# Patient Record
Sex: FEMALE | Race: BLACK OR AFRICAN AMERICAN | Employment: FULL TIME | URBAN - METROPOLITAN AREA
[De-identification: names, ages, dates, MRNs, and addresses within clinical notes are randomized per-mention and may not be internally consistent; named-entity substitution may affect disease eponyms.]

---

## 2019-09-23 ENCOUNTER — APPOINTMENT (OUTPATIENT)
Dept: VASCULAR SURGERY | Age: 44
End: 2019-09-23
Attending: EMERGENCY MEDICINE
Payer: COMMERCIAL

## 2019-09-23 ENCOUNTER — HOSPITAL ENCOUNTER (EMERGENCY)
Age: 44
Discharge: HOME | End: 2019-09-23
Attending: EMERGENCY MEDICINE
Payer: COMMERCIAL

## 2019-09-23 ENCOUNTER — APPOINTMENT (OUTPATIENT)
Dept: GENERAL RADIOLOGY | Age: 44
End: 2019-09-23
Attending: PHYSICIAN ASSISTANT
Payer: COMMERCIAL

## 2019-09-23 VITALS
DIASTOLIC BLOOD PRESSURE: 84 MMHG | TEMPERATURE: 98.5 F | BODY MASS INDEX: 40.04 KG/M2 | SYSTOLIC BLOOD PRESSURE: 141 MMHG | HEIGHT: 63 IN | OXYGEN SATURATION: 96 % | WEIGHT: 226 LBS | RESPIRATION RATE: 18 BRPM | HEART RATE: 92 BPM

## 2019-09-23 DIAGNOSIS — M62.838 MUSCLE SPASM: ICD-10-CM

## 2019-09-23 DIAGNOSIS — M79.662 PAIN OF LEFT CALF: Primary | ICD-10-CM

## 2019-09-23 LAB
ANION GAP SERPL CALC-SCNC: 8 MMOL/L (ref 3–18)
BASOPHILS # BLD: 0 K/UL (ref 0–0.1)
BASOPHILS NFR BLD: 0 % (ref 0–2)
BUN SERPL-MCNC: 12 MG/DL (ref 7–18)
BUN/CREAT SERPL: 17 (ref 12–20)
CALCIUM SERPL-MCNC: 8.5 MG/DL (ref 8.5–10.1)
CHLORIDE SERPL-SCNC: 109 MMOL/L (ref 100–111)
CO2 SERPL-SCNC: 25 MMOL/L (ref 21–32)
CREAT SERPL-MCNC: 0.71 MG/DL (ref 0.6–1.3)
D DIMER PPP FEU-MCNC: 3.39 UG/ML(FEU)
DIFFERENTIAL METHOD BLD: ABNORMAL
EOSINOPHIL # BLD: 0.1 K/UL (ref 0–0.4)
EOSINOPHIL NFR BLD: 2 % (ref 0–5)
ERYTHROCYTE [DISTWIDTH] IN BLOOD BY AUTOMATED COUNT: 12.7 % (ref 11.6–14.5)
GLUCOSE SERPL-MCNC: 98 MG/DL (ref 74–99)
HCT VFR BLD AUTO: 34.7 % (ref 35–45)
HGB BLD-MCNC: 11.4 G/DL (ref 12–16)
LYMPHOCYTES # BLD: 2.8 K/UL (ref 0.9–3.6)
LYMPHOCYTES NFR BLD: 35 % (ref 21–52)
MCH RBC QN AUTO: 28.4 PG (ref 24–34)
MCHC RBC AUTO-ENTMCNC: 32.9 G/DL (ref 31–37)
MCV RBC AUTO: 86.3 FL (ref 74–97)
MONOCYTES # BLD: 0.5 K/UL (ref 0.05–1.2)
MONOCYTES NFR BLD: 6 % (ref 3–10)
NEUTS SEG # BLD: 4.6 K/UL (ref 1.8–8)
NEUTS SEG NFR BLD: 57 % (ref 40–73)
PLATELET # BLD AUTO: 266 K/UL (ref 135–420)
PMV BLD AUTO: 9.9 FL (ref 9.2–11.8)
POTASSIUM SERPL-SCNC: 3.7 MMOL/L (ref 3.5–5.5)
RBC # BLD AUTO: 4.02 M/UL (ref 4.2–5.3)
SODIUM SERPL-SCNC: 142 MMOL/L (ref 136–145)
WBC # BLD AUTO: 8 K/UL (ref 4.6–13.2)

## 2019-09-23 PROCEDURE — 96372 THER/PROPH/DIAG INJ SC/IM: CPT

## 2019-09-23 PROCEDURE — 93971 EXTREMITY STUDY: CPT

## 2019-09-23 PROCEDURE — 85025 COMPLETE CBC W/AUTO DIFF WBC: CPT

## 2019-09-23 PROCEDURE — 73590 X-RAY EXAM OF LOWER LEG: CPT

## 2019-09-23 PROCEDURE — 80048 BASIC METABOLIC PNL TOTAL CA: CPT

## 2019-09-23 PROCEDURE — 99283 EMERGENCY DEPT VISIT LOW MDM: CPT

## 2019-09-23 PROCEDURE — 85379 FIBRIN DEGRADATION QUANT: CPT

## 2019-09-23 RX ORDER — METHOCARBAMOL 750 MG/1
750 TABLET, FILM COATED ORAL 3 TIMES DAILY
Qty: 15 TAB | Refills: 0 | Status: SHIPPED | OUTPATIENT
Start: 2019-09-23

## 2019-09-23 RX ORDER — KETOROLAC TROMETHAMINE 15 MG/ML
15 INJECTION, SOLUTION INTRAMUSCULAR; INTRAVENOUS
Status: DISCONTINUED | OUTPATIENT
Start: 2019-09-23 | End: 2019-09-23 | Stop reason: HOSPADM

## 2019-09-23 RX ORDER — ERGOCALCIFEROL 1.25 MG/1
50000 CAPSULE ORAL
COMMUNITY

## 2019-09-23 RX ORDER — BUSPIRONE HYDROCHLORIDE 10 MG/1
20 TABLET ORAL 3 TIMES DAILY
COMMUNITY

## 2019-09-23 RX ORDER — NORGESTIMATE AND ETHINYL ESTRADIOL 0.25-0.035
1 KIT ORAL DAILY
COMMUNITY

## 2019-09-23 RX ORDER — ALPRAZOLAM 0.25 MG/1
0.25 TABLET ORAL AS NEEDED
COMMUNITY

## 2019-09-23 NOTE — ED PROVIDER NOTES
[facility]  Emergency Department     Diana Rosenberg is a 38 y.o. female seen on 9/24/2019 at 4:01 PM in the [address] [facility] EMERGENCY DEPT in room HFT1/QA. Chief Complaint   Patient presents with    Leg Pain       HPI:   Diana Rosenberg is a 38 y.o. female who complaints of left calf pain, onset 3 days ago, causing difficulty sleeping and ambulating. Has not noticed any edema. She is on birth control but is a non-smoker, denies any recent long-term immobilization, trips, casts etc.  Denies injury to the leg. She has no history of blood clots or clotting disorder. She \"googled \" the condition and is very concerned about a blood clot.         Review of Systems:    CONST:  Denies: fever, chills, weakness  ENT: Denies: sore throat, earache, nasal congestion  EYES: Denies: vision changes, double vision, discharge  CARDIO: Denies: chest pain, palpitations  RESP: Denies: cough, shortness of breath, dyspnea on exertion  GI: Denies: abdominal pain, nausea, vomiting, diarrhea, melena, hematochezia  : Denies: dysuria, hematuria, urinary frequency  MUSC: Denies: muscle aches, joint pain  SKIN: Denies: hives, rash  PSYCH: Denies: anxiety, depression  ENDO: Denies: polyuria, polydipsia  Heme/Lymph:      Denies: easy bruising, bleeding  NEURO: Denies: headache, confusion, change in behavior, extremity,weakness, paresthesias    Past Medical History: Primary Care Doctor: Kaylyn Walter MD     Past Medical History:   Diagnosis Date    Anxiety     Hypertension     Vitamin D deficiency      Past Surgical History:   Procedure Laterality Date    HX GYN      tubes tide     Social History     Socioeconomic History    Marital status: SINGLE     Spouse name: Not on file    Number of children: Not on file    Years of education: Not on file    Highest education level: Not on file   Tobacco Use    Smoking status: Never Smoker    Smokeless tobacco: Never Used   Substance and Sexual Activity    Alcohol use: Never     Frequency: Never    Drug use: Never     No current facility-administered medications on file prior to encounter. Current Outpatient Medications on File Prior to Encounter   Medication Sig Dispense Refill    busPIRone (BUSPAR) 10 mg tablet Take 20 mg by mouth three (3) times daily.  norgestimate-ethinyl estradiol (SPRINTEC, 28,) 0.25-35 mg-mcg tab Take 1 Tab by mouth daily.  ALPRAZolam (XANAX) 0.25 mg tablet Take 0.25 mg by mouth as needed for Anxiety.  ergocalciferol (VITAMIN D2) 50,000 unit capsule Take 50,000 Units by mouth two (2) days a week. No Known Allergies     Physical Exam:    Vitals:    09/23/19 1227   BP: 141/84   Pulse: 92   Resp: 18   Temp: 98.5 °F (36.9 °C)   SpO2: 96%     Vital signs were reviewed. Constitutional: well appearing, nontoxic  Head: Atraumatic, normo-cephalic  Cardiovascular: regular rate and rhythm, no murmur, 2+ radial pulses  Respiratory: clear to auscultation with no wheezes, rales, ronchi  Abdomen: soft, non-tender, no guarding/rebound, no percussion tenderness  Musculoskeletal: Left calf demonstrates significant tenderness to palpation along the posterior tib-fib area (calf), with a positive Homans sign. No palpable ropey areas are noted ,no deformities, edema. The limb appears to be neurovascularly intact distally  Skin: dry, no rashes  Neurologic: alert, oriented, answers questions follows commands  Psychiatric: Speech pattern and content normal     _________________________________________________________  Medical Decision Making:    Differential Diagnosis for this patient includes: DVT, muscle strain, muscle spasm, occult tibia fracture, contusion      Labs/Radiographs/ECG were ordered:       _________________________________________________________  ED Evaluation    Labs:   No results found for this or any previous visit (from the past 24 hour(s)). Labs were reviewed and interpreted by me.     Radiology studies performed:   XR TIB/FIB LT   Final Result IMPRESSION:      No acute abnormalities. No evidence of deep vein thrombosis by lower extremity ultrasound    Radiographs were visualized by me    Medications - No data to display  _________________________________________________________  Procedures  ======================================================  ASSESSMENT: Left calf pain and an otherwise healthy female who is on birth control pills. However she is a non-smoker. She has no significant risk factors for DVT, however, given the degree of her discomfort, a duplex ultrasound was performed to rule out a DVT. This was negative. A plain film x-ray was also performed in order to rule out an occult fracture which was also negative. She responded well to Toradol which minimized her discomfort. Pain is likely secondary to muscle spasm    PLAN: Continue ibuprofen/Tylenol. Flexeril was prescribed for spasm as needed. Apply heat to the area to help relax the muscle and rest of the leg.   Up with primary care provider as needed or return to the emergency department as needed    _________________________________________________________    Condition: Stable   Disposition: Home   diagnosis: Muscle spasm of left calf     ALFA Paz; 9/24/2019 @4:01 PM================================

## 2019-09-23 NOTE — DISCHARGE INSTRUCTIONS
Patient Education        Leg Pain: Care Instructions  Your Care Instructions  Many things can cause leg pain. Too much exercise or overuse can cause a muscle cramp (or charley horse). You can get leg cramps from not eating a balanced diet that has enough potassium, calcium, and other minerals. If you do not drink enough fluids or are taking certain medicines, you may develop leg cramps. Other causes of leg pain include injuries, blood flow problems, nerve damage, and twisted and enlarged veins (varicose veins). You can usually ease pain with self-care. Your doctor may recommend that you rest your leg and keep it elevated. Follow-up care is a key part of your treatment and safety. Be sure to make and go to all appointments, and call your doctor if you are having problems. It's also a good idea to know your test results and keep a list of the medicines you take. How can you care for yourself at home? · Take pain medicines exactly as directed. ? If the doctor gave you a prescription medicine for pain, take it as prescribed. ? If you are not taking a prescription pain medicine, ask your doctor if you can take an over-the-counter medicine. · Take any other medicines exactly as prescribed. Call your doctor if you think you are having a problem with your medicine. · Rest your leg while you have pain, and avoid standing for long periods of time. · Prop up your leg at or above the level of your heart when possible. · Make sure you are eating a balanced diet that is rich in calcium, potassium, and magnesium, especially if you are pregnant. · If directed by your doctor, put ice or a cold pack on the area for 10 to 20 minutes at a time. Put a thin cloth between the ice and your skin. · Your leg may be in a splint, a brace, or an elastic bandage, and you may have crutches to help you walk. Follow your doctor's directions about how long to wear supports and how to use the crutches.   When should you call for help?  Call 911 anytime you think you may need emergency care. For example, call if:    · You have sudden chest pain and shortness of breath, or you cough up blood.     · Your leg is cool or pale or changes color.    Call your doctor now or seek immediate medical care if:    · You have increasing or severe pain.     · Your leg suddenly feels weak and you cannot move it.     · You have signs of a blood clot, such as:  ? Pain in your calf, back of the knee, thigh, or groin. ? Redness and swelling in your leg or groin.     · You have signs of infection, such as:  ? Increased pain, swelling, warmth, or redness. ? Red streaks leading from the sore area. ? Pus draining from a place on your leg. ? A fever.     · You cannot bear weight on your leg.    Watch closely for changes in your health, and be sure to contact your doctor if:    · You do not get better as expected. Where can you learn more? Go to http://eros-mayra.info/. Enter M684 in the search box to learn more about \"Leg Pain: Care Instructions. \"  Current as of: June 26, 2019  Content Version: 12.2  © 6409-4919 [employer]. Care instructions adapted under license by [employer] (which disclaims liability or warranty for this information). If you have questions about a medical condition or this instruction, always ask your healthcare professional. [place] [address] any warranty or liability for your use of this information. Patient Education        Muscle Cramps: Care Instructions  Your Care Instructions    A muscle cramp occurs when a muscle tightens up suddenly. A cramp often happens in the legs. A muscle cramp is also called a muscle spasm or a charley horse. Muscle cramps usually last less than a minute. However, the pain may last for several minutes. Leg cramps that occur at night may wake you up.   Heavy exercise, dehydration, and being overweight can increase your risk of getting cramps. An imbalance of certain chemicals in your blood, called electrolytes, can also lead to muscle cramps. Pregnant women sometimes get muscle cramps during sleep. Muscle cramps can be treated by stretching and massaging the muscle. If cramps keep coming back, your doctor may prescribe medicine that relaxes your muscles. Follow-up care is a key part of your treatment and safety. Be sure to make and go to all appointments, and call your doctor if you are having problems. It's also a good idea to know your test results and keep a list of the medicines you take. How can you care for yourself at home? · Drink plenty of fluids to prevent dehydration. Choose water and other caffeine-free clear liquids until you feel better. If you have kidney, heart, or liver disease and have to limit fluids, talk with your doctor before you increase the amount of fluids you drink. · Stretch your muscles every day, especially before and after exercise and at bedtime. Regular stretching can relax your muscles and may prevent cramps. · Do not suddenly increase the amount of exercise you get. Increase your exercise a little each week. · When you get a cramp, stretch and massage the muscle. You can also take a warm shower or bath to relax the muscle. A heating pad placed on the muscle can also help. · Take a daily multivitamin supplement. · Ask your doctor if you can take an over-the-counter pain medicine, such as acetaminophen (Tylenol), ibuprofen (Advil, Motrin), or naproxen (Aleve). Be safe with medicines. Read and follow all instructions on the label. When should you call for help? Watch closely for changes in your health, and be sure to contact your doctor if:    · You get muscle cramps often that do not go away after home treatment.     · Your muscle cramps often wake you up at night.     · You do not get better as expected. Where can you learn more? Go to http://eros-mayra.info/.   Enter F295 in the search box to learn more about \"Muscle Cramps: Care Instructions. \"  Current as of: June 26, 2019  Content Version: 12.2  © 9654-3544 [employer]. Care instructions adapted under license by [employer] (which disclaims liability or warranty for this information). If you have questions about a medical condition or this instruction, always ask your healthcare professional. [place] [address] any warranty or liability for your use of this information.

## 2019-09-23 NOTE — LETTER
NOTIFICATION RETURN TO WORK / SCHOOL 
 
9/23/2019 5:29 PM 
 
Ms. Bonita Hahn [address] [place] [address] Street [id number] To Whom It May Concern: 
 
Bonita Hahn is currently under the care of [id number] [place] [address] EMERGENCY DEPT. She will return to work/school on: 09/25/2019 If there are questions or concerns please have the patient contact our office.  
 
 
 
Sincerely, 
 
 
 
 
ALFA Poe

## 2019-09-23 NOTE — ED TRIAGE NOTES
Patient state left calf pain that began Friday night. Denies any recent injury, long distance travel, or prior hx of blood clots to leg. States taking motrin and using icy hot. States some relief with 800 mg Motrin.